# Patient Record
Sex: FEMALE | Race: OTHER
[De-identification: names, ages, dates, MRNs, and addresses within clinical notes are randomized per-mention and may not be internally consistent; named-entity substitution may affect disease eponyms.]

---

## 2019-07-16 ENCOUNTER — HOSPITAL ENCOUNTER (INPATIENT)
Dept: HOSPITAL 25 - AA | Age: 39
LOS: 2 days | Discharge: HOME | DRG: 403 | End: 2019-07-18
Attending: SURGERY | Admitting: SURGERY
Payer: COMMERCIAL

## 2019-07-16 DIAGNOSIS — Z87.891: ICD-10-CM

## 2019-07-16 DIAGNOSIS — E66.01: Primary | ICD-10-CM

## 2019-07-16 DIAGNOSIS — J45.909: ICD-10-CM

## 2019-07-16 DIAGNOSIS — F32.9: ICD-10-CM

## 2019-07-16 DIAGNOSIS — Z88.5: ICD-10-CM

## 2019-07-16 DIAGNOSIS — Z91.018: ICD-10-CM

## 2019-07-16 DIAGNOSIS — Z91.013: ICD-10-CM

## 2019-07-16 DIAGNOSIS — I83.90: ICD-10-CM

## 2019-07-16 DIAGNOSIS — Z87.440: ICD-10-CM

## 2019-07-16 DIAGNOSIS — K21.9: ICD-10-CM

## 2019-07-16 DIAGNOSIS — Z83.49: ICD-10-CM

## 2019-07-16 DIAGNOSIS — K58.9: ICD-10-CM

## 2019-07-16 DIAGNOSIS — Z90.49: ICD-10-CM

## 2019-07-16 DIAGNOSIS — F41.9: ICD-10-CM

## 2019-07-16 DIAGNOSIS — Z82.49: ICD-10-CM

## 2019-07-16 PROCEDURE — 81025 URINE PREGNANCY TEST: CPT

## 2019-07-16 PROCEDURE — 43644 LAP GASTRIC BYPASS/ROUX-EN-Y: CPT

## 2019-07-16 PROCEDURE — C1776 JOINT DEVICE (IMPLANTABLE): HCPCS

## 2019-07-16 PROCEDURE — 0D164ZA BYPASS STOMACH TO JEJUNUM, PERCUTANEOUS ENDOSCOPIC APPROACH: ICD-10-PCS | Performed by: SURGERY

## 2019-07-16 RX ADMIN — HYDROMORPHONE HYDROCHLORIDE PRN MG: 1 INJECTION, SOLUTION INTRAMUSCULAR; INTRAVENOUS; SUBCUTANEOUS at 15:50

## 2019-07-16 RX ADMIN — HYDROMORPHONE HYDROCHLORIDE PRN MG: 1 INJECTION, SOLUTION INTRAMUSCULAR; INTRAVENOUS; SUBCUTANEOUS at 14:59

## 2019-07-16 RX ADMIN — SODIUM CHLORIDE, SODIUM LACTATE, POTASSIUM CHLORIDE, AND CALCIUM CHLORIDE SCH MLS/HR: 600; 310; 30; 20 INJECTION, SOLUTION INTRAVENOUS at 17:35

## 2019-07-16 RX ADMIN — HYDROMORPHONE HYDROCHLORIDE PRN MG: 1 INJECTION, SOLUTION INTRAMUSCULAR; INTRAVENOUS; SUBCUTANEOUS at 15:10

## 2019-07-16 RX ADMIN — HYDROMORPHONE HYDROCHLORIDE PRN MG: 1 INJECTION, SOLUTION INTRAMUSCULAR; INTRAVENOUS; SUBCUTANEOUS at 15:15

## 2019-07-16 RX ADMIN — HYDROMORPHONE HYDROCHLORIDE PRN MG: 1 INJECTION, SOLUTION INTRAMUSCULAR; INTRAVENOUS; SUBCUTANEOUS at 16:12

## 2019-07-16 RX ADMIN — ONDANSETRON PRN MG: 2 INJECTION INTRAMUSCULAR; INTRAVENOUS at 17:31

## 2019-07-16 RX ADMIN — HYDROMORPHONE HYDROCHLORIDE PRN MG: 1 INJECTION, SOLUTION INTRAMUSCULAR; INTRAVENOUS; SUBCUTANEOUS at 15:39

## 2019-07-16 RX ADMIN — KETOROLAC TROMETHAMINE PRN MG: 30 INJECTION, SOLUTION INTRAMUSCULAR; INTRAVENOUS at 19:58

## 2019-07-16 RX ADMIN — HEPARIN SODIUM SCH UNITS: 5000 INJECTION INTRAVENOUS; SUBCUTANEOUS at 22:09

## 2019-07-16 RX ADMIN — FAMOTIDINE SCH MG: 10 INJECTION, SOLUTION INTRAVENOUS at 19:44

## 2019-07-16 RX ADMIN — ONDANSETRON PRN MG: 2 INJECTION INTRAMUSCULAR; INTRAVENOUS at 23:55

## 2019-07-16 RX ADMIN — HYDROMORPHONE HYDROCHLORIDE PRN MG: 1 INJECTION, SOLUTION INTRAMUSCULAR; INTRAVENOUS; SUBCUTANEOUS at 15:04

## 2019-07-16 RX ADMIN — HYDROMORPHONE HYDROCHLORIDE PRN MG: 1 INJECTION, SOLUTION INTRAMUSCULAR; INTRAVENOUS; SUBCUTANEOUS at 15:20

## 2019-07-16 NOTE — OP
Operative Report - Blank





- Operative Report


Date of Operation: 07/16/19


Note: 





Brief Operative Note


Preop Dx: morbid obesity


Postop Dx: same


Procedure: laparoscopic eleno en y gastric bypass


Anesthesia: GET


Surgeon: Anabell


Assistant: GIBRAN Bowman; MOJGAN Hooper


Fluids: 2700 ml RL


EBL: < 50 ml


Specimen: none


Drains: none


Findings: dictated

## 2019-07-17 RX ADMIN — HEPARIN SODIUM SCH UNITS: 5000 INJECTION INTRAVENOUS; SUBCUTANEOUS at 14:14

## 2019-07-17 RX ADMIN — HEPARIN SODIUM SCH UNITS: 5000 INJECTION INTRAVENOUS; SUBCUTANEOUS at 22:10

## 2019-07-17 RX ADMIN — KETOROLAC TROMETHAMINE PRN MG: 30 INJECTION, SOLUTION INTRAMUSCULAR; INTRAVENOUS at 06:03

## 2019-07-17 RX ADMIN — HEPARIN SODIUM SCH UNITS: 5000 INJECTION INTRAVENOUS; SUBCUTANEOUS at 06:05

## 2019-07-17 RX ADMIN — KETOROLAC TROMETHAMINE PRN MG: 30 INJECTION, SOLUTION INTRAMUSCULAR; INTRAVENOUS at 20:25

## 2019-07-17 RX ADMIN — SODIUM CHLORIDE, SODIUM LACTATE, POTASSIUM CHLORIDE, AND CALCIUM CHLORIDE SCH MLS/HR: 600; 310; 30; 20 INJECTION, SOLUTION INTRAVENOUS at 00:07

## 2019-07-17 RX ADMIN — FAMOTIDINE SCH MG: 10 INJECTION, SOLUTION INTRAVENOUS at 20:31

## 2019-07-17 RX ADMIN — KETOROLAC TROMETHAMINE PRN MG: 30 INJECTION, SOLUTION INTRAMUSCULAR; INTRAVENOUS at 14:14

## 2019-07-17 RX ADMIN — SODIUM CHLORIDE, SODIUM LACTATE, POTASSIUM CHLORIDE, AND CALCIUM CHLORIDE SCH MLS/HR: 600; 310; 30; 20 INJECTION, SOLUTION INTRAVENOUS at 06:11

## 2019-07-17 RX ADMIN — DEXTROSE MONOHYDRATE, SODIUM CHLORIDE, AND POTASSIUM CHLORIDE SCH MLS/HR: 50; 4.5; 1.49 INJECTION, SOLUTION INTRAVENOUS at 19:35

## 2019-07-17 RX ADMIN — FAMOTIDINE SCH MG: 10 INJECTION, SOLUTION INTRAVENOUS at 09:05

## 2019-07-17 NOTE — PN
Progress Note





- Progress Note


Date of Service: 07/17/19


SOAP: 


Subjective:


Heidi reports she has abdominal pain that is 5/10, diffuse across her upper 

abdomen, and sore in quality. It is not exacerbated by movement or sipping on 

liquids. She was last given ketorolac at about 6 this morning, the patient 

reported this controlled her pain well until the last hour. She has been 

tolerating small amounts of clear liquids well, denied any nausea or vomiting. 

She has had some burning, which improved with pepcid. She has not yet had a 

bowel movement, unsure of flatus. She has been burping. She has been ambulating 

and doing laps every few hours.


Denies chest pain, shortness of breath, and leg pain.





Acetaminophen (Tylenol Adult Liq*)  650 mg PO Q6H PRN


   PRN Reason: Temp > 101 F Or Mild Pain


Albuterol (Ventolin Hfa Inhaler*)  2 puff INH Q4H PRN


   PRN Reason: SOB/WHEEZING


Diphenhydramine HCl (Benadryl Iv*)  25 mg SLOW PUSH Q6H PRN


   PRN Reason: ITCHING


Famotidine (Pepcid Iv*)  20 mg IV SLOW PU BID [facility]


   Last Admin: 07/17/19 09:05 Dose:  20 mg


Fluticasone Propionate (Flovent Hfa 44 Mcg(Nf))  1 puff INH DAILY PRN


   PRN Reason: SOB/WHEEZING


Heparin Sodium (Porcine) (Heparin Vial(*))  5,000 units SUBCUT Q8HR [facility]


   Last Admin: 07/17/19 06:05 Dose:  5,000 units


Hydromorphone HCl (Dilaudid Inj1s*)  0.5 mg IV SLOW PU Q3H PRN


   PRN Reason: Pain - Moderate Severe


Hydromorphone HCl (Dilaudid Inj1s*)  1 mg IV SLOW PU Q3H PRN


   PRN Reason: PAIN - SEVERE


   Last Admin: 07/16/19 23:53 Dose:  1 mg


Potassium Chloride/Dextrose (D5w 1/2 Ns Kcl 20 Meq 1000 Ml*)  1,000 mls @ 125 

mls/hr IV PER RATE [facility]


Lactated Ringer's (Lactated Ringers 1000 Ml Bag*)  1,000 mls @ 150 mls/hr IV 

PER RATE [facility]


   Stop: 07/17/19 15:04


   Last Admin: 07/17/19 06:11 Dose:  150 mls/hr


Ketorolac Tromethamine (Toradol Inj*)  30 mg IV Q6H PRN


   PRN Reason: PAIN


   Stop: 07/18/19 15:04


   Last Admin: 07/17/19 06:03 Dose:  30 mg


Ondansetron HCl (Zofran Inj*)  4 mg IV Q6H PRN


   PRN Reason: NAUSEA/VOMITING


   Last Admin: 07/16/19 23:55 Dose:  4 mg


Tramadol HCl (Ultram*)  50 mg PO Q6H PRN


   PRN Reason: moderate pain








Objective:


 Vital Signs - 12 hr











  Temp Pulse Resp BP Pulse Ox


 


 07/17/19 11:21  98.1 F  83  16  139/78  100


 


 07/17/19 08:00    15  


 


 07/17/19 07:52  98.1 F  85  15  118/66  98


 


 07/17/19 03:11  98.2 F  87  15  127/82  97


 


 07/17/19 02:30    16  








 Intake & Output











 07/15/19 07/16/19 07/17/19 07/18/19





 06:59 06:59 06:59 06:59


 


Intake Total   3718 


 


Output Total   2550 


 


Balance   1168 


 


Weight   247 lb 


 


Intake:    


 


  IV Fluids   3718 


 


    LR   3718 


 


  Oral   0 


 


Output:    


 


  Urine   2550 


 


Other:    


 


  # Bowel Movements   0 





 





Exam


general: comfortable lying in bed, no acute distress, alert and oriented


Heart: regular rate and rhythm, no murmur appreciated


Lungs: symmetrical chest expansion, no increased effort, clear to auscultation 

bilaterally 


Abdomen: 6 laparoscopic incision sites c/d/i. hypoactive bowel sounds. tender 

to light palpation


Extremities: no rashes, or ulcerations. Pedal pulses present. No calf tenderness








Assessment:


status post op day 1 laparoscopic eleno en y gastric bypass - stable, improving








Plan:


continue bariatric clears as tolerated. continue ambulating frequently. 

Continue PPI.

## 2019-07-17 NOTE — OP
:  [facility]; Anupam Machado MD *

 

DATE OF OPERATION:  07/16/19 - ROOM #351

 

DATE OF BIRTH:  [birth year]

 

SURGEON:  Dr. Hung.

 

ASSISTANT:  GIBRAN Faye

 

ANESTHESIOLOGIST:  Genia Simon MD

 

ANESTHESIA:  General endotracheal.

 

PRE-OP DIAGNOSIS:  Clinically severe obesity.

 

POST-OP DIAGNOSIS:  Clinically severe obesity.

 

OPERATIVE PROCEDURE:  Laparoscopic Kerry-en-Y gastric bypass.

 

ESTIMATED BLOOD LOSS:  Less than 50 mL.

 

IV FLUIDS:  2.7 L crystalloid.

 

SPECIMENS:  None.

 

DRAINS:  None.

 

COMPLICATIONS:  None.

 

COUNTS:  The instrument, needle, and sponge counts were correct.

 

DESCRIPTION OF PROCEDURE:  The patient was brought to the operating room and 
placed on the table supine.  Sequential compression devices were placed on both 
lower extremities and general anesthesia was administered.  The patient was 
positioned and padded appropriately and then administered intravenous 
antibiotics.  He was prepped and draped in the usual sterile fashion.  A time-
out was then performed.

 

Local anesthetic was infiltrated into the skin and soft tissue prior to making 
each incision.  Entry into the abdomen was through a left upper quadrant 
incision made to accommodate a 12 mm optical trocar.  After accessing the 
peritoneal cavity, carbon dioxide was insufflated to a pressure of 15 mmHg.  
Under direct visualization, a 12 mm bladeless trocars were placed in the 
supraumbilical midline and in the right upper quadrant.  5 mm trocars were 
placed in the right upper quadrant medially and left upper quadrant laterally.  
A Nirmal liver retractor was placed percutaneously in the subxiphoid 
position and used to elevate the left lobe of the liver.  Inspection of the 
abdominal cavity was performed and there was a moderate to small sized omentum.
  The gastric anatomy appeared normal. Inspection was performed in the pelvis 
to evaluate, at patient's request, concern for possible endometriosis.  The 
patient's uterus appeared to be fibroid.  The left adnexa had some adhesions to 
the sigmoid colon. These were sharply lysed. The left and right ovaries 
appeared normal.  The uterus was elevated and inspection of the cul-de-sac was 
performed and there did not appear to be any endometriosis present.

 

At this point, the operation proceeded with mobilization of the fundus of the 
stomach away from the left kasandra of the diaphragm.  This was performed bluntly.  
I then went ahead and created in the lesser omentum a perigastric dissection on 
the lesser curvature, the lesser sac was entered, and then with gentle firings 
of the Endo RUTH stapler with a tan cartridges, the gastric pouches created and 
this approximated 15 to 30 mL volume.  The staple lines were noted to be intact 
and hemostatic.  Next, the omentum and transverse colon were retracted 
cephalad.  The ligament of Treitz was identified and the loop of jejunum was 
measured out approximately 50 cm.  This looped to the ligament of Treitz.  The 
limb was oriented appropriately and it was sutured to the lateral left staple 
line on the gastric pouch with interrupted 2-0 silk sutures.  Next, the 
gastrojejunal anastomosis was performed with the Endo RUTH stapler with a 30 mm 
tan cartridge.  The common gastroenterotomy was then closed with 3-0 PDS over a 
34-Yoruba gastric lavage tube. Lastly, the omega loop was divided to the left 
of the anastomosis to complete this. The anastomosis was then tested with 
methylene blue dye solution instilled through the gastric lavage tube, no leak 
was identified.  The tube was withdrawn.  The Kerry limb was then measured out 
75 cm, and at this point, a functional end-to-side jejunojejunostomy was 
created with a a 60 mm tan Endo RUTH stapler.  The common enterotomy was run 
closed with 3-0 PDS running from each end, tying in the middle of the defect.  
The mesenteric defect was then closed with interrupted figure-of-eight 3-0 
silks.  Additional 3-0 silk was placed as anti-obstruction suture at the 
proximal portion of the anastomosis.  Orientation of the Kerry limb was 
confirmed. Hemostasis was assured.  Ports were removed under direct 
visualization as was the Nirmal liver retractor.  Carbon dioxide was 
released.  The incisions were closed with 4-0 Monocryl to approximate the skin.
  Steri-Strips were applied with dressing. The patient tolerated the procedure 
well, was extubated and transferred to recovery in stable condition.

 

 [id number]/[facility] #: [id number]

Burke Rehabilitation HospitalDANILO

## 2019-07-18 VITALS — SYSTOLIC BLOOD PRESSURE: 118 MMHG | DIASTOLIC BLOOD PRESSURE: 72 MMHG

## 2019-07-18 RX ADMIN — HEPARIN SODIUM SCH UNITS: 5000 INJECTION INTRAVENOUS; SUBCUTANEOUS at 06:05

## 2019-07-18 RX ADMIN — FAMOTIDINE SCH MG: 10 INJECTION, SOLUTION INTRAVENOUS at 08:12

## 2019-07-18 RX ADMIN — DEXTROSE MONOHYDRATE, SODIUM CHLORIDE, AND POTASSIUM CHLORIDE SCH MLS/HR: 50; 4.5; 1.49 INJECTION, SOLUTION INTRAVENOUS at 03:30

## 2019-07-18 NOTE — PN
Progress Note





- Progress Note


Date of Service: 07/18/19


SOAP: 


Subjective:


Heidi reports feeling better than yesterday. She reports her abdominal pain has 

decreased to about 3/10 qualifying it as soreness, this is tolerable to her. 

She denies nausea, vomiting, and diarrhea. She has been tolerating clear 

liquids well, and increased her rate after nursing reminded her. GERD well 

managed with pepcid. She reports flatus, no bowel movement. She reports an 

intermittent "wet cough" which started yesterday evening. Denies pain with 

inspiration.


She denies shortness of breath and chest pain. She has been ambulating 

frequently and wearing the SCDs when in bed. 





Acetaminophen (Tylenol Adult Liq*)  650 mg PO Q6H PRN


   PRN Reason: Temp > 101 F Or Mild Pain


Albuterol (Ventolin Hfa Inhaler*)  2 puff INH Q4H PRN


   PRN Reason: SOB/WHEEZING


Diphenhydramine HCl (Benadryl Iv*)  25 mg SLOW PUSH Q6H PRN


   PRN Reason: ITCHING


Famotidine (Pepcid Iv*)  20 mg IV SLOW PU BID [facility]


   Last Admin: 07/17/19 20:31 Dose:  20 mg


Fluticasone Propionate (Flovent Hfa 44 Mcg(Nf))  1 puff INH DAILY PRN


   PRN Reason: SOB/WHEEZING


Heparin Sodium (Porcine) (Heparin Vial(*))  5,000 units SUBCUT Q8HR [facility]


   Last Admin: 07/18/19 06:05 Dose:  5,000 units


Hydromorphone HCl (Dilaudid Inj1s*)  0.5 mg IV SLOW PU Q3H PRN


   PRN Reason: Pain - Moderate Severe


Hydromorphone HCl (Dilaudid Inj1s*)  1 mg IV SLOW PU Q3H PRN


   PRN Reason: PAIN - SEVERE


   Last Admin: 07/16/19 23:53 Dose:  1 mg


Potassium Chloride/Dextrose (D5w 1/2 Ns Kcl 20 Meq 1000 Ml*)  1,000 mls @ 125 

mls/hr IV PER RATE [facility]


   Last Admin: 07/18/19 03:30 Dose:  125 mls/hr


Ketorolac Tromethamine (Toradol Inj*)  30 mg IV Q6H PRN


   PRN Reason: PAIN


   Stop: 07/18/19 15:04


   Last Admin: 07/17/19 20:25 Dose:  30 mg


Ondansetron HCl (Zofran Inj*)  4 mg IV Q6H PRN


   PRN Reason: NAUSEA/VOMITING


   Last Admin: 07/16/19 23:55 Dose:  4 mg


Tramadol HCl (Ultram*)  50 mg PO Q6H PRN


   PRN Reason: moderate pain








Objective:


 Vital Signs - 12 hr











  Temp Pulse Resp BP Pulse Ox


 


 07/18/19 07:41    14   94


 


 07/18/19 07:31  98.4 F  88  14  118/72  94


 


 07/18/19 05:43      93


 


 07/18/19 03:30  98.6 F  90  15  116/74  93


 


 07/17/19 23:29  98.3 F  96  15  126/71  94








 Intake & Output











 07/16/19 07/17/19 07/18/19 07/19/19





 06:59 06:59 06:59 06:59


 


Intake Total  3718 3530 


 


Output Total  2550 3700 0


 


Balance  1168 -170 0


 


Weight  247 lb  


 


Intake:    


 


  IV Fluids  3718 2960 


 


    LR  3718 2960 


 


  Oral  0 570 


 


Output:    


 


  Urine  2550 3700 0


 


Other:    


 


  # Bowel Movements  0 0 





 


Exam:


General: comfortable lying in bed, NAD, alert and oriented


Heart: regular rate an rhythm, no murmur appreciated


Lungs: symmetrical chest expansion, no increased effort of breathing, clear to 

auscultation bilaterally


Abdomen: round and soft, 6 incision sites clean, dry, and intact. mild 

tenderness to palpation. hypoactive bowel sounds present.


Extremities: no edema, rashes, or ulceration. no calf tenderness. 











Assessment:


Status postop day 2 eleno en y gastric bypass - steady improvement








Plan:


Continue bariatric clears. Continue ambulating frequently. Continue PPI. 

Discharge home today.

## 2019-07-18 NOTE — DS
CC:  [facility]; Dr. Anupam Machado *

 

DISCHARGE SUMMARY:

 

DATE OF ADMISSION:  07/16/19

 

DATE OF DISCHARGE:  07/18/19

 

ATTENDING SURGEON:  Dr. Piotr Hung * (GIBRAN Faye, dictating).

 

HOSPITAL COURSE:  Please refer to admission history and physical and operative 
note for details.  Briefly, the patient was taken to the operating room on 07/16
/19 by Dr. Hung and underwent laparoscopic Kerry-en-Y gastric bypass.  
Surgery itself and postoperative course had been unremarkable.  She has 
gradually progressed in terms of intake of bariatric clear liquids.  Pain has 
been under good control.  The patient was seen on the morning of discharge by 
Dr. Hung.  She was deemed ready for discharge.  She will continue her usual 
omeprazole, but hold her cholestyramine.  Instructions have been reviewed 
regarding wound care, diet, and activity.  She has a followup scheduled next 
week at [facility].  She is discharged to home in good condition.

 

____________________________________ GIBRAN FAYE

 

[id number]/[facility] #: [id number]

MTDD